# Patient Record
Sex: FEMALE | Race: WHITE | NOT HISPANIC OR LATINO | Employment: OTHER | URBAN - NONMETROPOLITAN AREA
[De-identification: names, ages, dates, MRNs, and addresses within clinical notes are randomized per-mention and may not be internally consistent; named-entity substitution may affect disease eponyms.]

---

## 2017-02-17 ENCOUNTER — APPOINTMENT (OUTPATIENT)
Dept: GENERAL RADIOLOGY | Facility: HOSPITAL | Age: 57
End: 2017-02-17

## 2017-02-17 ENCOUNTER — HOSPITAL ENCOUNTER (INPATIENT)
Facility: HOSPITAL | Age: 57
LOS: 1 days | Discharge: LEFT BEFORE DISCHARGE | End: 2017-02-17
Attending: FAMILY MEDICINE | Admitting: FAMILY MEDICINE

## 2017-02-17 ENCOUNTER — APPOINTMENT (OUTPATIENT)
Dept: ULTRASOUND IMAGING | Facility: HOSPITAL | Age: 57
End: 2017-02-17

## 2017-02-17 ENCOUNTER — APPOINTMENT (OUTPATIENT)
Dept: CT IMAGING | Facility: HOSPITAL | Age: 57
End: 2017-02-17

## 2017-02-17 VITALS
RESPIRATION RATE: 16 BRPM | BODY MASS INDEX: 30.2 KG/M2 | HEIGHT: 66 IN | DIASTOLIC BLOOD PRESSURE: 60 MMHG | WEIGHT: 187.9 LBS | OXYGEN SATURATION: 100 % | HEART RATE: 80 BPM | SYSTOLIC BLOOD PRESSURE: 91 MMHG | TEMPERATURE: 97.5 F

## 2017-02-17 DIAGNOSIS — E88.09 PROTEINS SERUM PLASMA LOW: ICD-10-CM

## 2017-02-17 DIAGNOSIS — R09.02 HYPOXEMIA: ICD-10-CM

## 2017-02-17 DIAGNOSIS — R07.9 CHEST PAIN, UNSPECIFIED TYPE: ICD-10-CM

## 2017-02-17 DIAGNOSIS — I95.9 HYPOTENSION, UNSPECIFIED HYPOTENSION TYPE: Primary | ICD-10-CM

## 2017-02-17 DIAGNOSIS — I50.9 HEART FAILURE, UNSPECIFIED (HCC): ICD-10-CM

## 2017-02-17 LAB
ALBUMIN SERPL-MCNC: 2.7 G/DL (ref 3.5–5)
ALBUMIN SERPL-MCNC: 2.8 G/DL (ref 3.5–5)
ALBUMIN/GLOB SERPL: 1.2 G/DL (ref 1.1–2.5)
ALBUMIN/GLOB SERPL: 1.4 G/DL (ref 1.1–2.5)
ALP SERPL-CCNC: 208 U/L (ref 24–120)
ALP SERPL-CCNC: 214 U/L (ref 24–120)
ALT SERPL W P-5'-P-CCNC: 220 U/L (ref 0–54)
ALT SERPL W P-5'-P-CCNC: 394 U/L (ref 0–54)
AMMONIA BLD-SCNC: <9 UMOL/L (ref 9–33)
AMYLASE SERPL-CCNC: 35 U/L (ref 30–110)
ANION GAP SERPL CALCULATED.3IONS-SCNC: 7 MMOL/L (ref 4–13)
ANION GAP SERPL CALCULATED.3IONS-SCNC: 9 MMOL/L (ref 4–13)
APTT PPP: 26.4 SECONDS (ref 24.1–34.8)
ARTERIAL PATENCY WRIST A: ABNORMAL
AST SERPL-CCNC: 468 U/L (ref 7–45)
AST SERPL-CCNC: 535 U/L (ref 7–45)
ATMOSPHERIC PRESS: ABNORMAL MMHG
BACTERIA BLD CULT: ABNORMAL
BACTERIA UR QL AUTO: ABNORMAL /HPF
BASE EXCESS BLDA CALC-SCNC: -3.5 MMOL/L (ref -2–2)
BASOPHILS # BLD AUTO: 0.01 10*3/MM3 (ref 0–0.2)
BASOPHILS NFR BLD AUTO: 0.1 % (ref 0–2)
BDY SITE: ABNORMAL
BILIRUB SERPL-MCNC: 0.3 MG/DL (ref 0.1–1)
BILIRUB SERPL-MCNC: 0.4 MG/DL (ref 0.1–1)
BILIRUB UR QL STRIP: NEGATIVE
BUN BLD-MCNC: 23 MG/DL (ref 5–21)
BUN BLD-MCNC: 24 MG/DL (ref 5–21)
BUN/CREAT SERPL: 22.4 (ref 7–25)
BUN/CREAT SERPL: 22.5 (ref 7–25)
CALCIUM SPEC-SCNC: 8.1 MG/DL (ref 8.4–10.4)
CALCIUM SPEC-SCNC: 8.7 MG/DL (ref 8.4–10.4)
CHLORIDE SERPL-SCNC: 104 MMOL/L (ref 98–110)
CHLORIDE SERPL-SCNC: 105 MMOL/L (ref 98–110)
CK MB SERPL-CCNC: 1.75 NG/ML (ref 0–5)
CLARITY UR: ABNORMAL
CO2 SERPL-SCNC: 23 MMOL/L (ref 24–31)
CO2 SERPL-SCNC: 26 MMOL/L (ref 24–31)
COHGB MFR BLD: 4 % (ref 0–5.1)
COLOR UR: YELLOW
CREAT BLD-MCNC: 1.02 MG/DL (ref 0.5–1.4)
CREAT BLD-MCNC: 1.07 MG/DL (ref 0.5–1.4)
CRP SERPL-MCNC: <0.5 MG/DL (ref 0–0.99)
D DIMER PPP FEU-MCNC: 3.66 MG/L (FEU) (ref 0–0.5)
DEPRECATED RDW RBC AUTO: 50.8 FL (ref 40–54)
DEPRECATED RDW RBC AUTO: 51.1 FL (ref 40–54)
EOSINOPHIL # BLD AUTO: 0.03 10*3/MM3 (ref 0–0.7)
EOSINOPHIL NFR BLD AUTO: 0.2 % (ref 0–4)
ERYTHROCYTE [DISTWIDTH] IN BLOOD BY AUTOMATED COUNT: 18.2 % (ref 12–15)
ERYTHROCYTE [DISTWIDTH] IN BLOOD BY AUTOMATED COUNT: 18.3 % (ref 12–15)
ERYTHROCYTE [SEDIMENTATION RATE] IN BLOOD: 1 MM/HR (ref 0–20)
GFR SERPL CREATININE-BSD FRML MDRD: 53 ML/MIN/1.73
GFR SERPL CREATININE-BSD FRML MDRD: 56 ML/MIN/1.73
GLOBULIN UR ELPH-MCNC: 2 GM/DL
GLOBULIN UR ELPH-MCNC: 2.2 GM/DL
GLUCOSE BLD-MCNC: 86 MG/DL (ref 70–100)
GLUCOSE BLD-MCNC: 89 MG/DL (ref 70–100)
GLUCOSE UR STRIP-MCNC: NEGATIVE MG/DL
HAV IGM SERPL QL IA: NEGATIVE
HBV CORE IGM SERPL QL IA: NEGATIVE
HBV SURFACE AG SERPL QL IA: NEGATIVE
HCO3 BLDA-SCNC: 20.7 MMOL/L (ref 22–26)
HCT VFR BLD AUTO: 34.3 % (ref 37–47)
HCT VFR BLD AUTO: 34.8 % (ref 37–47)
HCV AB SER DONR QL: NEGATIVE
HCV S/C RATIO: 0.01 (ref 0–0.99)
HGB BLD-MCNC: 10 G/DL (ref 12–16)
HGB BLD-MCNC: 9.7 G/DL (ref 12–16)
HGB BLDA-MCNC: 9.9 G/DL (ref 12–16)
HGB UR QL STRIP.AUTO: NEGATIVE
HOLD SPECIMEN: NORMAL
HOLD SPECIMEN: NORMAL
HYALINE CASTS UR QL AUTO: ABNORMAL /LPF
HYPOCHROMIA BLD QL: ABNORMAL
IMM GRANULOCYTES # BLD: 0.34 10*3/MM3 (ref 0–0.03)
IMM GRANULOCYTES NFR BLD: 1.9 % (ref 0–5)
INR PPP: 1.22 (ref 0.91–1.09)
KETONES UR QL STRIP: NEGATIVE
LEUKOCYTE ESTERASE UR QL STRIP.AUTO: ABNORMAL
LIPASE SERPL-CCNC: 85 U/L (ref 23–203)
LYMPHOCYTES # BLD AUTO: 0.18 10*3/MM3 (ref 0.72–4.86)
LYMPHOCYTES # BLD MANUAL: 0.48 10*3/MM3 (ref 0.72–4.86)
LYMPHOCYTES NFR BLD AUTO: 1 % (ref 15–45)
LYMPHOCYTES NFR BLD MANUAL: 1 % (ref 15–45)
LYMPHOCYTES NFR BLD MANUAL: 1 % (ref 4–12)
Lab: ABNORMAL
MCH RBC QN AUTO: 21.7 PG (ref 28–32)
MCH RBC QN AUTO: 21.9 PG (ref 28–32)
MCHC RBC AUTO-ENTMCNC: 28.3 G/DL (ref 33–36)
MCHC RBC AUTO-ENTMCNC: 28.7 G/DL (ref 33–36)
MCV RBC AUTO: 76.3 FL (ref 82–98)
MCV RBC AUTO: 76.9 FL (ref 82–98)
METHGB BLD QL: 0 % (ref 0.4–1.5)
MODALITY: ABNORMAL
MONOCYTES # BLD AUTO: 0.04 10*3/MM3 (ref 0.19–1.3)
MONOCYTES # BLD AUTO: 0.48 10*3/MM3 (ref 0.19–1.3)
MONOCYTES NFR BLD AUTO: 0.2 % (ref 4–12)
MYOGLOBIN SERPL-MCNC: 86.6 NG/ML (ref 0–110)
NEUTROPHILS # BLD AUTO: 17.41 10*3/MM3 (ref 1.87–8.4)
NEUTROPHILS # BLD AUTO: 46.93 10*3/MM3 (ref 1.87–8.4)
NEUTROPHILS NFR BLD AUTO: 96.6 % (ref 39–78)
NEUTROPHILS NFR BLD MANUAL: 80 % (ref 39–78)
NEUTS BAND NFR BLD MANUAL: 18 % (ref 0–10)
NITRITE UR QL STRIP: NEGATIVE
NOTIFIED BY: ABNORMAL
NOTIFIED WHO: ABNORMAL
NRBC BLD MANUAL-RTO: 0.5 /100 WBC (ref 0–0)
NT-PROBNP SERPL-MCNC: ABNORMAL PG/ML (ref 0–900)
OXYHGB MFR BLDV: 83.8 % (ref 94–97)
PCO2 BLDA: 33.8 MM HG (ref 35–45)
PH BLDA: 7.4 PH UNITS (ref 7.35–7.45)
PH UR STRIP.AUTO: 6 [PH] (ref 5–8)
PLAT MORPH BLD: NORMAL
PLATELET # BLD AUTO: 262 10*3/MM3 (ref 130–400)
PLATELET # BLD AUTO: 274 10*3/MM3 (ref 130–400)
PMV BLD AUTO: 9.2 FL (ref 6–12)
PMV BLD AUTO: 9.5 FL (ref 6–12)
PO2 BLDA: 62.5 MM HG (ref 80–100)
POIKILOCYTOSIS BLD QL SMEAR: ABNORMAL
POTASSIUM BLD-SCNC: 3.6 MMOL/L (ref 3.5–5.3)
POTASSIUM BLD-SCNC: 4.2 MMOL/L (ref 3.5–5.3)
POTASSIUM BLDA-SCNC: 3.49 MMOL/L (ref 3.5–5)
PROT SERPL-MCNC: 4.8 G/DL (ref 6.3–8.7)
PROT SERPL-MCNC: 4.9 G/DL (ref 6.3–8.7)
PROT UR QL STRIP: ABNORMAL
PROTHROMBIN TIME: 15.8 SECONDS (ref 11.9–14.6)
RBC # BLD AUTO: 4.46 10*6/MM3 (ref 4.2–5.4)
RBC # BLD AUTO: 4.56 10*6/MM3 (ref 4.2–5.4)
RBC # UR: ABNORMAL /HPF
REF LAB TEST METHOD: ABNORMAL
SCAN SLIDE: NORMAL
SODIUM BLD-SCNC: 137 MMOL/L (ref 135–145)
SODIUM BLD-SCNC: 137 MMOL/L (ref 135–145)
SODIUM BLDA-SCNC: 133.8 MMOL/L (ref 135–145)
SP GR UR STRIP: 1.01 (ref 1–1.03)
SQUAMOUS #/AREA URNS HPF: ABNORMAL /HPF
TROPONIN I SERPL-MCNC: 0.07 NG/ML (ref 0–0.03)
TROPONIN I SERPL-MCNC: 0.07 NG/ML (ref 0–0.03)
TROPONIN I SERPL-MCNC: 0.07 NG/ML (ref 0–0.07)
TROPONIN I SERPL-MCNC: 0.09 NG/ML (ref 0–0.03)
TROPONIN I SERPL-MCNC: 0.1 NG/ML (ref 0–0.03)
UROBILINOGEN UR QL STRIP: ABNORMAL
WBC MORPH BLD: NORMAL
WBC NRBC COR # BLD: 18.01 10*3/MM3 (ref 4.8–10.8)
WBC NRBC COR # BLD: 47.89 10*3/MM3 (ref 4.8–10.8)
WBC UR QL AUTO: ABNORMAL /HPF
WHOLE BLOOD HOLD SPECIMEN: NORMAL
WHOLE BLOOD HOLD SPECIMEN: NORMAL

## 2017-02-17 PROCEDURE — 93005 ELECTROCARDIOGRAM TRACING: CPT

## 2017-02-17 PROCEDURE — 25010000002 ONDANSETRON PER 1 MG: Performed by: FAMILY MEDICINE

## 2017-02-17 PROCEDURE — 82805 BLOOD GASES W/O2 SATURATION: CPT

## 2017-02-17 PROCEDURE — 99285 EMERGENCY DEPT VISIT HI MDM: CPT

## 2017-02-17 PROCEDURE — 87040 BLOOD CULTURE FOR BACTERIA: CPT | Performed by: FAMILY MEDICINE

## 2017-02-17 PROCEDURE — 87186 SC STD MICRODIL/AGAR DIL: CPT | Performed by: INTERNAL MEDICINE

## 2017-02-17 PROCEDURE — 71275 CT ANGIOGRAPHY CHEST: CPT

## 2017-02-17 PROCEDURE — 84484 ASSAY OF TROPONIN QUANT: CPT | Performed by: FAMILY MEDICINE

## 2017-02-17 PROCEDURE — 83050 HGB METHEMOGLOBIN QUAN: CPT

## 2017-02-17 PROCEDURE — 83880 ASSAY OF NATRIURETIC PEPTIDE: CPT | Performed by: FAMILY MEDICINE

## 2017-02-17 PROCEDURE — 85025 COMPLETE CBC W/AUTO DIFF WBC: CPT | Performed by: FAMILY MEDICINE

## 2017-02-17 PROCEDURE — 85651 RBC SED RATE NONAUTOMATED: CPT | Performed by: FAMILY MEDICINE

## 2017-02-17 PROCEDURE — 93010 ELECTROCARDIOGRAM REPORT: CPT | Performed by: INTERNAL MEDICINE

## 2017-02-17 PROCEDURE — 87185 SC STD ENZYME DETCJ PER NZM: CPT | Performed by: FAMILY MEDICINE

## 2017-02-17 PROCEDURE — 0 IOPAMIDOL PER 1 ML: Performed by: FAMILY MEDICINE

## 2017-02-17 PROCEDURE — 82150 ASSAY OF AMYLASE: CPT | Performed by: INTERNAL MEDICINE

## 2017-02-17 PROCEDURE — 85610 PROTHROMBIN TIME: CPT | Performed by: INTERNAL MEDICINE

## 2017-02-17 PROCEDURE — 80074 ACUTE HEPATITIS PANEL: CPT | Performed by: INTERNAL MEDICINE

## 2017-02-17 PROCEDURE — 87088 URINE BACTERIA CULTURE: CPT | Performed by: INTERNAL MEDICINE

## 2017-02-17 PROCEDURE — 87040 BLOOD CULTURE FOR BACTERIA: CPT | Performed by: INTERNAL MEDICINE

## 2017-02-17 PROCEDURE — 87205 SMEAR GRAM STAIN: CPT | Performed by: FAMILY MEDICINE

## 2017-02-17 PROCEDURE — 25010000002 ALBUMIN HUMAN 25% PER 50 ML: Performed by: FAMILY MEDICINE

## 2017-02-17 PROCEDURE — 25010000002 DOPAMINE PER 40 MG: Performed by: FAMILY MEDICINE

## 2017-02-17 PROCEDURE — 87077 CULTURE AEROBIC IDENTIFY: CPT | Performed by: INTERNAL MEDICINE

## 2017-02-17 PROCEDURE — 87147 CULTURE TYPE IMMUNOLOGIC: CPT | Performed by: FAMILY MEDICINE

## 2017-02-17 PROCEDURE — 82140 ASSAY OF AMMONIA: CPT | Performed by: INTERNAL MEDICINE

## 2017-02-17 PROCEDURE — 85379 FIBRIN DEGRADATION QUANT: CPT | Performed by: FAMILY MEDICINE

## 2017-02-17 PROCEDURE — 94799 UNLISTED PULMONARY SVC/PX: CPT

## 2017-02-17 PROCEDURE — 87150 DNA/RNA AMPLIFIED PROBE: CPT | Performed by: FAMILY MEDICINE

## 2017-02-17 PROCEDURE — 71010 HC CHEST PA OR AP: CPT

## 2017-02-17 PROCEDURE — 87086 URINE CULTURE/COLONY COUNT: CPT | Performed by: INTERNAL MEDICINE

## 2017-02-17 PROCEDURE — 80053 COMPREHEN METABOLIC PANEL: CPT | Performed by: FAMILY MEDICINE

## 2017-02-17 PROCEDURE — 84484 ASSAY OF TROPONIN QUANT: CPT

## 2017-02-17 PROCEDURE — 82553 CREATINE MB FRACTION: CPT | Performed by: FAMILY MEDICINE

## 2017-02-17 PROCEDURE — 82375 ASSAY CARBOXYHB QUANT: CPT

## 2017-02-17 PROCEDURE — 84484 ASSAY OF TROPONIN QUANT: CPT | Performed by: INTERNAL MEDICINE

## 2017-02-17 PROCEDURE — 83690 ASSAY OF LIPASE: CPT | Performed by: INTERNAL MEDICINE

## 2017-02-17 PROCEDURE — 25010000002 LEVOFLOXACIN PER 250 MG: Performed by: INTERNAL MEDICINE

## 2017-02-17 PROCEDURE — 86140 C-REACTIVE PROTEIN: CPT | Performed by: FAMILY MEDICINE

## 2017-02-17 PROCEDURE — 85730 THROMBOPLASTIN TIME PARTIAL: CPT | Performed by: INTERNAL MEDICINE

## 2017-02-17 PROCEDURE — P9046 ALBUMIN (HUMAN), 25%, 20 ML: HCPCS | Performed by: FAMILY MEDICINE

## 2017-02-17 PROCEDURE — 76705 ECHO EXAM OF ABDOMEN: CPT

## 2017-02-17 PROCEDURE — 87186 SC STD MICRODIL/AGAR DIL: CPT | Performed by: FAMILY MEDICINE

## 2017-02-17 PROCEDURE — 25010000002 HYDROCORTISONE SODIUM SUCCINATE 100 MG RECONSTITUTED SOLUTION: Performed by: INTERNAL MEDICINE

## 2017-02-17 PROCEDURE — 83874 ASSAY OF MYOGLOBIN: CPT | Performed by: FAMILY MEDICINE

## 2017-02-17 PROCEDURE — 36600 WITHDRAWAL OF ARTERIAL BLOOD: CPT

## 2017-02-17 PROCEDURE — 81001 URINALYSIS AUTO W/SCOPE: CPT | Performed by: INTERNAL MEDICINE

## 2017-02-17 PROCEDURE — 85007 BL SMEAR W/DIFF WBC COUNT: CPT | Performed by: FAMILY MEDICINE

## 2017-02-17 RX ORDER — ONDANSETRON 2 MG/ML
4 INJECTION INTRAMUSCULAR; INTRAVENOUS EVERY 6 HOURS PRN
Status: DISCONTINUED | OUTPATIENT
Start: 2017-02-17 | End: 2017-02-17 | Stop reason: HOSPADM

## 2017-02-17 RX ORDER — SODIUM CHLORIDE 9 MG/ML
40 INJECTION, SOLUTION INTRAVENOUS CONTINUOUS
Status: DISCONTINUED | OUTPATIENT
Start: 2017-02-17 | End: 2017-02-17 | Stop reason: HOSPADM

## 2017-02-17 RX ORDER — ALBUMIN (HUMAN) 12.5 G/50ML
12.5 SOLUTION INTRAVENOUS ONCE
Status: COMPLETED | OUTPATIENT
Start: 2017-02-17 | End: 2017-02-17

## 2017-02-17 RX ORDER — ALBUMIN (HUMAN) 12.5 G/50ML
25 SOLUTION INTRAVENOUS ONCE
Status: DISCONTINUED | OUTPATIENT
Start: 2017-02-18 | End: 2017-02-17 | Stop reason: HOSPADM

## 2017-02-17 RX ORDER — LEVOTHYROXINE SODIUM 0.12 MG/1
125 TABLET ORAL DAILY
Status: DISCONTINUED | OUTPATIENT
Start: 2017-02-17 | End: 2017-02-17 | Stop reason: HOSPADM

## 2017-02-17 RX ORDER — CLOPIDOGREL BISULFATE 75 MG/1
75 TABLET ORAL DAILY
Status: DISCONTINUED | OUTPATIENT
Start: 2017-02-17 | End: 2017-02-17 | Stop reason: SDUPTHER

## 2017-02-17 RX ORDER — DOPAMINE HYDROCHLORIDE 160 MG/100ML
2-20 INJECTION, SOLUTION INTRAVENOUS
Status: DISCONTINUED | OUTPATIENT
Start: 2017-02-17 | End: 2017-02-17 | Stop reason: HOSPADM

## 2017-02-17 RX ORDER — ASPIRIN 81 MG/1
81 TABLET, CHEWABLE ORAL DAILY
Status: DISCONTINUED | OUTPATIENT
Start: 2017-02-17 | End: 2017-02-17 | Stop reason: HOSPADM

## 2017-02-17 RX ORDER — CLOPIDOGREL BISULFATE 75 MG/1
75 TABLET ORAL DAILY
Status: DISCONTINUED | OUTPATIENT
Start: 2017-02-17 | End: 2017-02-17 | Stop reason: HOSPADM

## 2017-02-17 RX ORDER — ONDANSETRON 2 MG/ML
4 INJECTION INTRAMUSCULAR; INTRAVENOUS ONCE
Status: COMPLETED | OUTPATIENT
Start: 2017-02-17 | End: 2017-02-17

## 2017-02-17 RX ORDER — PREDNISONE 10 MG/1
10 TABLET ORAL DAILY
Status: DISCONTINUED | OUTPATIENT
Start: 2017-02-17 | End: 2017-02-17

## 2017-02-17 RX ORDER — IPRATROPIUM BROMIDE AND ALBUTEROL SULFATE 2.5; .5 MG/3ML; MG/3ML
3 SOLUTION RESPIRATORY (INHALATION) EVERY 4 HOURS PRN
Status: DISCONTINUED | OUTPATIENT
Start: 2017-02-17 | End: 2017-02-17 | Stop reason: HOSPADM

## 2017-02-17 RX ORDER — PANTOPRAZOLE SODIUM 40 MG/1
40 TABLET, DELAYED RELEASE ORAL
Status: DISCONTINUED | OUTPATIENT
Start: 2017-02-17 | End: 2017-02-17 | Stop reason: HOSPADM

## 2017-02-17 RX ORDER — LEVOFLOXACIN 5 MG/ML
750 INJECTION, SOLUTION INTRAVENOUS EVERY 24 HOURS
Status: DISCONTINUED | OUTPATIENT
Start: 2017-02-17 | End: 2017-02-17 | Stop reason: HOSPADM

## 2017-02-17 RX ORDER — ACETAMINOPHEN 325 MG/1
650 TABLET ORAL EVERY 6 HOURS PRN
Status: DISCONTINUED | OUTPATIENT
Start: 2017-02-17 | End: 2017-02-17 | Stop reason: HOSPADM

## 2017-02-17 RX ORDER — MORPHINE SULFATE 2 MG/ML
2 INJECTION, SOLUTION INTRAMUSCULAR; INTRAVENOUS ONCE
Status: DISCONTINUED | OUTPATIENT
Start: 2017-02-17 | End: 2017-02-17

## 2017-02-17 RX ORDER — MIDODRINE HYDROCHLORIDE 10 MG/1
10 TABLET ORAL 3 TIMES DAILY
Status: DISCONTINUED | OUTPATIENT
Start: 2017-02-17 | End: 2017-02-17

## 2017-02-17 RX ADMIN — ALBUMIN HUMAN 12.5 G: 0.25 SOLUTION INTRAVENOUS at 12:30

## 2017-02-17 RX ADMIN — METRONIDAZOLE 500 MG: 500 INJECTION, SOLUTION INTRAVENOUS at 15:04

## 2017-02-17 RX ADMIN — SODIUM CHLORIDE 500 ML: 0.9 INJECTION, SOLUTION INTRAVENOUS at 02:12

## 2017-02-17 RX ADMIN — PANTOPRAZOLE SODIUM 40 MG: 40 TABLET, DELAYED RELEASE ORAL at 06:48

## 2017-02-17 RX ADMIN — HYDROCORTISONE SODIUM SUCCINATE 100 MG: 100 INJECTION, POWDER, FOR SOLUTION INTRAMUSCULAR; INTRAVENOUS at 06:48

## 2017-02-17 RX ADMIN — LEVOTHYROXINE SODIUM 125 MCG: 0.12 TABLET ORAL at 12:30

## 2017-02-17 RX ADMIN — HYDROCORTISONE SODIUM SUCCINATE 100 MG: 100 INJECTION, POWDER, FOR SOLUTION INTRAMUSCULAR; INTRAVENOUS at 15:04

## 2017-02-17 RX ADMIN — DOPAMINE HYDROCHLORIDE 7 MCG/KG/MIN: 160 INJECTION, SOLUTION INTRAVENOUS at 12:30

## 2017-02-17 RX ADMIN — ACETAMINOPHEN 650 MG: 325 TABLET, FILM COATED ORAL at 15:43

## 2017-02-17 RX ADMIN — SODIUM CHLORIDE 125 ML/HR: 9 INJECTION, SOLUTION INTRAVENOUS at 15:04

## 2017-02-17 RX ADMIN — ONDANSETRON HYDROCHLORIDE 4 MG: 2 SOLUTION INTRAMUSCULAR; INTRAVENOUS at 02:44

## 2017-02-17 RX ADMIN — IOPAMIDOL 150 ML: 755 INJECTION, SOLUTION INTRAVENOUS at 11:15

## 2017-02-17 RX ADMIN — CLOPIDOGREL BISULFATE 75 MG: 75 TABLET, FILM COATED ORAL at 12:30

## 2017-02-17 RX ADMIN — METRONIDAZOLE 500 MG: 500 INJECTION, SOLUTION INTRAVENOUS at 08:38

## 2017-02-17 RX ADMIN — LEVOFLOXACIN 750 MG: 5 INJECTION, SOLUTION INTRAVENOUS at 06:36

## 2017-02-17 RX ADMIN — SODIUM CHLORIDE 125 ML/HR: 9 INJECTION, SOLUTION INTRAVENOUS at 05:21

## 2017-02-17 RX ADMIN — Medication 81 MG: at 12:29

## 2017-02-17 RX ADMIN — DOPAMINE HYDROCHLORIDE 5 MCG/KG/MIN: 160 INJECTION, SOLUTION INTRAVENOUS at 03:49

## 2017-02-17 NOTE — PROGRESS NOTES
[facility]  INPATIENT PROGRESS NOTE    Length of Stay: 0  Date of Admission: 2/17/2017  Primary Care Physician: Pramod Alberts DO    Subjective   Chief Complaint: Pain between shoulder blades.     HPI   Still with some nausea.   Still having pain between shoulder blades.    Chest discomfort.  Wants a otero.  No reported diarrhea this AM    Dr Sina Alberts called and history reviewed with him.      Review of Systems   Constitutional: Positive for fatigue.   HENT: Negative.    Eyes: Negative.    Respiratory: Positive for shortness of breath.    Cardiovascular: Positive for chest pain and leg swelling.   Gastrointestinal: Positive for diarrhea, nausea and vomiting.   Endocrine: Negative.    Genitourinary: Negative.    Musculoskeletal: Positive for arthralgias, back pain (sharp between shoulder blades.) and myalgias.   Skin: Positive for wound (bilateral knees with abraisions.).   Allergic/Immunologic: Negative.    Neurological: Positive for weakness.   Hematological: Bruises/bleeds easily.   Psychiatric/Behavioral: Negative.           Objective    Temp:  [96.7 °F (35.9 °C)-97.5 °F (36.4 °C)] 96.7 °F (35.9 °C)  Heart Rate:  [] 95  Resp:  [12-19] 13  BP: ()/(36-65) 112/65  Physical Exam   Constitutional: She is oriented to person, place, and time. She appears well-developed and well-nourished.   HENT:   Head: Normocephalic and atraumatic.   Mouth/Throat: Oropharynx is clear and moist.   Eyes: Conjunctivae and EOM are normal. Pupils are equal, round, and reactive to light.   Neck: Neck supple. No JVD present. No tracheal deviation present.   Cardiovascular: Normal rate, regular rhythm, normal heart sounds and intact distal pulses.  Exam reveals no gallop and no friction rub.    No murmur heard.  Pulmonary/Chest: Effort normal and breath sounds normal. No respiratory distress.   Abdominal: Soft. Bowel sounds are normal. There is no tenderness.    Musculoskeletal: She exhibits edema (bilateral lower extremity edema ongoing problem).   Neurological: She is alert and oriented to person, place, and time. No cranial nerve deficit.   Skin: Skin is warm and dry. She is not diaphoretic.   Bruising.   Psychiatric:   Affect flat.           Results Review:  I have reviewed the labs, radiology results, and diagnostic studies.    Laboratory Data:     Results from last 7 days  Lab Units 02/17/17  0119   WBC 10*3/mm3 18.01*   HEMOGLOBIN g/dL 10.0*   HEMATOCRIT % 34.8*   PLATELETS 10*3/mm3 262          Results from last 7 days  Lab Units 02/17/17  0208 02/17/17  0119   SODIUM mmol/L  --  137   SODIUM, ARTERIAL mmol/L 133.8*  --    POTASSIUM mmol/L  --  3.6   CHLORIDE mmol/L  --  105   TOTAL CO2 mmol/L  --  23.0*   BUN mg/dL  --  23*   CREATININE mg/dL  --  1.02   CALCIUM mg/dL  --  8.7   BILIRUBIN mg/dL  --  0.4   ALK PHOS U/L  --  214*   ALT (SGPT) U/L  --  220*   AST (SGOT) U/L  --  468*   GLUCOSE mg/dL  --  89       Culture Data:        Radiology Data:   Imaging Results (last 24 hours)     Procedure Component Value Units Date/Time    XR Chest 1 View [[id number]] Collected:  02/17/17 0725     Updated:  02/17/17 0728    Narrative:       EXAMINATION:  XR CHEST 1 VW-  2/17/2017 2:00 AM CST     HISTORY: Chest pain.     COMPARISON: 08/01/2012.     FINDINGS:  There is no dense infiltrate or effusion. Heart size is upper  limits of normal. No CHF. There is elevation or eventration of the right  hemidiaphragm. There is a new port catheter on the left with the  catheter tip extending towards the brachiocephalic vein. It does not  extend into the superior vena cava. There is a PICC line catheter on the  right in good position.       Impression:       Portable chest x-ray demonstrates no active disease.        This report was finalized on [id number] by Dr. Haroon Trammell MD.          I have reviewed the patient current medications.     Assessment/Plan     Hospital Problem List      Hypotension          Assessment    Hypotension on vasopressor (dopamine)  Pain between shoulder blades.  Nausea  Vomiting  Diarrhea at home.  CAD/MI  Hypoalbuminemia/transamintis chronic, sees liver specialist in [facility]  Adrenal insufficiency  On chronic steroid therapy  GERD  Hypoxemia  Hx of DVT/PE    Plan    CTA chest  IVF  Continue vasopressor support  Repeat lab work stat, troponin, cbc, cmp  IV steroids.  Lab in AM  CMP, CBC, Chest xray        Discharge Planning: I expect patient to be discharged to home in 4-5 days.    Lorna James DO   02/17/17   8:15 AM

## 2017-02-17 NOTE — ED PROVIDER NOTES
Subjective   Patient is a 56 y.o. female presenting with nausea.   Nausea   The primary symptoms include fatigue, nausea and vomiting. The illness began today. The onset was gradual.   The illness is also significant for chills and back pain.       Review of Systems   Constitutional: Positive for chills and fatigue.   Respiratory: Positive for chest tightness and shortness of breath.    Cardiovascular: Positive for chest pain.   Gastrointestinal: Positive for nausea and vomiting.   Musculoskeletal: Positive for back pain.       Past Medical History   Diagnosis Date   • Abnormal albumin    • Clotting disorder    • Coronary artery disease    • Disease of thyroid gland    • Hypertension    • MI (myocardial infarction)        No Known Allergies    Past Surgical History   Procedure Laterality Date   • Cardiac catheterization N/A 12/23/2016     Procedure: Left Heart Cath;  Surgeon: Alexx Richardson MD;  Location: [facility] CATH INVASIVE LOCATION;  Service:        History reviewed. No pertinent family history.    Social History     Social History   • Marital status: Single     Spouse name: N/A   • Number of children: N/A   • Years of education: N/A     Social History Main Topics   • Smoking status: Current Every Day Smoker     Packs/day: 1.00     Years: 15.00     Start date: 12/22/2016   • Smokeless tobacco: Never Used   • Alcohol use No   • Drug use: No   • Sexual activity: Defer     Other Topics Concern   • None     Social History Narrative   • None           Objective   Physical Exam   Constitutional: She is oriented to person, place, and time. She appears well-developed and well-nourished.   HENT:   Head: Normocephalic.   Nose: Nose normal.   Mouth/Throat: Oropharynx is clear and moist.   Eyes: Conjunctivae and EOM are normal. Pupils are equal, round, and reactive to light.   Neck: Normal range of motion. Neck supple. No JVD present. No thyromegaly present.   Cardiovascular: Normal rate, regular rhythm, normal heart sounds  and intact distal pulses.    Pulmonary/Chest: Effort normal and breath sounds normal.   Abdominal: Soft. Bowel sounds are normal. She exhibits no distension and no mass. There is no tenderness. There is no rebound and no guarding.   Musculoskeletal: Normal range of motion.   Lymphadenopathy:     She has no cervical adenopathy.   Neurological: She is alert and oriented to person, place, and time.   Skin: Skin is warm and dry. No rash noted. No erythema. There is pallor.   Psychiatric: She has a normal mood and affect. Her behavior is normal. Judgment and thought content normal.   Nursing note and vitals reviewed.      Procedures         ED Course  ED Course        Labs Reviewed   COMPREHENSIVE METABOLIC PANEL - Abnormal; Notable for the following:        Result Value    BUN 23 (*)     CO2 23.0 (*)     Total Protein 4.8 (*)     Albumin 2.80 (*)     ALT (SGPT) 220 (*)     AST (SGOT) 468 (*)     Alkaline Phosphatase 214 (*)     eGFR Non  Amer 56 (*)     All other components within normal limits   BNP (IN-HOUSE) - Abnormal; Notable for the following:     proBNP 89023.0 (*)     All other components within normal limits   D-DIMER, QUANTITATIVE - Abnormal; Notable for the following:     D-Dimer, Quantitative 3.66 (*)     All other components within normal limits    Narrative:     Reference Range is 0-0.50 mg/L FEU. However, results <0.50 mg/L FEU tends to rule out DVT or PE. Results >0.50 mg/L FEU are not useful in predicting absence or presence of DVT or PE.   CBC WITH AUTO DIFFERENTIAL - Abnormal; Notable for the following:     WBC 18.01 (*)     Hemoglobin 10.0 (*)     Hematocrit 34.8 (*)     MCV 76.3 (*)     MCH 21.9 (*)     MCHC 28.7 (*)     RDW 18.3 (*)     Neutrophil % 96.6 (*)     Lymphocyte % 1.0 (*)     Monocyte % 0.2 (*)     Neutrophils, Absolute 17.41 (*)     Lymphocytes, Absolute 0.18 (*)     Monocytes, Absolute 0.04 (*)     Immature Grans, Absolute 0.34 (*)     nRBC 0.5 (*)     All other components  within normal limits   BLOOD GAS, ARTERIAL W/CO-OXIMETRY - Abnormal; Notable for the following:     pCO2, Arterial 33.8 (*)     pO2, Arterial 62.5 (*)     HCO3, Arterial 20.7 (*)     Base Excess, Arterial -3.5 (*)     Hemoglobin, Blood Gas 9.9 (*)     Oxyhemoglobin 83.8 (*)     Methemoglobin 0.0 (*)     Sodium, Arterial 133.8 (*)     Potassium, Arterial 3.49 (*)     All other components within normal limits    Narrative:     Serial Number: [id number]    : [id number]   MYOGLOBIN, SERUM - Normal   CK MB - Normal    Narrative:     CKMB Index not indicated   C-REACTIVE PROTEIN - Normal   SEDIMENTATION RATE - Normal   POCT TROPONIN I, RAPID - Normal   BLOOD CULTURE WITH JONATHON   BLOOD CULTURE   BLOOD CULTURE   OVA AND PARASITE EXAMINATION   CLOSTRIDIUM DIFFICILE TOXIN, PCR   FECAL LEUKOCYTES   GASTROINTESTINAL PANEL, PCR   BLOOD GAS, ARTERIAL W/CO-OXIMETRY   RAINBOW DRAW    Narrative:     The following orders were created for panel order [place] Draw.  Procedure                               Abnormality         Status                     ---------                               -----------         ------                     Light Blue Top[[id number]]                                    Final result               Green Top (Gel)[[id number]]                                   Final result               Lavender Top[[id number]]                                      Final result               Red Top[[id number]]                                           Final result               Green Top (No Gel)[[id number]]                                                             Please view results for these tests on the individual orders.   URINALYSIS W/ CULTURE IF INDICATED   HEPATITIS PANEL, ACUTE   TROPONIN (IN-HOUSE)   TROPONIN (IN-HOUSE)   URINALYSIS W/ CULTURE IF INDICATED   OCCULT BLOOD X 1, STOOL   AMMONIA   PROTIME-INR   APTT   AMYLASE   LIPASE   POCT TROPONIN I, RAPID   LIGHT BLUE TOP   GREEN TOP   LAVENDER TOP   RED TOP   CBC AND  DIFFERENTIAL    Narrative:     The following orders were created for panel order CBC & Differential.  Procedure                               Abnormality         Status                     ---------                               -----------         ------                     CBC Auto Differential[[id number]]         Abnormal            Final result                 Please view results for these tests on the individual orders.   GREEN TOP NO GEL               MDM  Number of Diagnoses or Management Options  Chest pain, unspecified type: new and requires workup  Heart failure, unspecified: new and requires workup  Hypotension, unspecified hypotension type: established and worsening  Hypoxemia: new and requires workup  Proteins serum plasma low: established and worsening     Amount and/or Complexity of Data Reviewed  Clinical lab tests: ordered and reviewed  Tests in the radiology section of CPT®: ordered and reviewed  Decide to obtain previous medical records or to obtain history from someone other than the patient: yes  Obtain history from someone other than the patient: yes  Review and summarize past medical records: yes  Discuss the patient with other providers: yes  Independent visualization of images, tracings, or specimens: yes    Risk of Complications, Morbidity, and/or Mortality  Presenting problems: high  Diagnostic procedures: high  Management options: high    Patient Progress  Patient progress: stable      Final diagnoses:   Hypotension, unspecified hypotension type   Heart failure, unspecified   Hypoxemia   Proteins serum plasma low   Chest pain, unspecified type            Ishaan Carlson MD  02/17/17 0631

## 2017-02-17 NOTE — H&P
"   TIME: 5:16 a.m.     ADMITTING PHYSICIAN:  Jag Carvajal MD for the Hospitalist Service.     PRIMARY CARE PROVIDER: Pramod Alberts DO    HISTORY:  Ms. Jameson is a 56-year-old  female who presents to [facility] due to a multiplicity of complaints including nausea, vomiting, diarrhea, diaphoresis, and headache. In addition, she also relates pain \"between the shoulder blades\", which she describes as quite severe. Her symptoms awoke her from sleep at approximately 11:00 p.m. last evening. Ms. Jameson receives albumin infusion due to hypoalbuminemia of uncertain etiology. She relates that she frequently has adverse type reactions such as nausea, vomiting and diarrhea. However, she has not informed her primary care physician, Dr. Alberts of these adverse events.     Also notes that Ms. Jameson, also relates having chronic leukocytosis. This came up for discussion while reviewing her laboratory studies. I reviewed her previous hospitalization and at that time her white blood cell count was within normal limits.     Ms. Jameson also informs me that she is on chronic prednisone therapy for adrenal insufficiency.     Presently, she is resting comfortably and is in no distress. At presentation, she was found to be hypotensive and tachycardic and for those reasons she was admitted to the intensive care.     REVIEW OF SYSTEMS: Otherwise unremarkable from a cardiovascular, pulmonary, gastrointestinal, genitourinary, neurologic, psychiatric, metabolic and constitutional standpoint except as noted. She relates fatigue and weakness. She has had no definite fevers, chills, or sweats. Her appetite has been good and her weight is stable. She has had no chest pain, chest palpitations, or shortness of breath. She relates chronic lower extremity swelling. She has had no orthopnea, cough, wheezing, or hemoptysis. She has had no abdominal pain, but relates nausea, vomiting and diarrhea. She has had " no constipation. She has had no dysphagia or odynophagia. She has had no hematemesis, hematochezia, or melena. She has had no flank pain, pelvic pain, hematuria, or dysuria. She has had no skin rashes, arthralgias or myalgias. She relates headaches. She has had no confusion, memory deficits or loss of consciousness. She has had no changes in her vision or hearing. She has had no acute motor or sensory deficits. She has had no gait abnormalities.     PAST MEDICAL HISTORY:  1.  Coronary artery disease.   2.  Myocardial infarction.   3.  Pneumonia.   4.  C. difficile colitis.   5.  Sepsis.   6.  Adrenal insufficiency per patient's report.   7.  Chronic prednisone use.   8.  Hypothyroidism.  9.  Chronic lower extremity edema.   10.  Hypoalbuminemia.   11.  Obesity.   12.  Gastroesophageal reflux disease.   13.  H. pylori positive with ongoing treatment with Augmentin.   14.  Restless leg syndrome.  15.  Hepatic cirrhosis?    PAST SURGICAL HISTORY:  1.  Status post cholecystectomy.   2.  Status post hysterectomy.   3.  Status post exploratory laparotomy for uncertain reasons.   4.  Status post ventral hernia repair.   5.  Status post cardiac stent deployment.     ALLERGIES: No known drug allergies.     HOME MEDICATIONS:  1.  Aspirin 81 mg p.o. daily.   2.  Lipitor 40 mg p.o. daily.   3.  Plavix 75 mg p.o. daily.   4.  Neurontin 300 mg p.o. t.i.d.   5.  Synthroid 0.125 mg p.o. daily.   6.  Zaroxolyn 5 mg p.o. daily.   7.  Midodrine 5 mg p.o. t.i.d.   8.  Prilosec 20 mg p.o. daily.   9.  Potassium chloride 20 mEq p.o. b.i.d.?   10.  Prednisone 10 mg p.o. daily.   11.  Seroquel 100 mg p.o. at bedtime.   12.  Requip 0.5 mg p.o. at bedtime.      SOCIAL HISTORY: Significant for being a resident of [place], [place]. She is . She has a son and daughter in good health. She relates being disabled. She has a high school education, as well as 2 years of college study. She smokes a pack of cigarettes per day. She has no  history of alcohol or illicit drug use. She attends [employer]. She has no recent history of travel outside this region.     She designates her sister, Sarina Flores, to serve as a surrogate for healthcare matters should such become necessary.     CODE STATUS: She is a FULL CODE.     FAMILY HISTORY: Significant for having 2 sisters in good health. Her mother is  due to heart attack. Her father is alive and is in apparent good health.     PHYSICAL EXAMINATION:  VITAL SIGNS: Temperature is 96.7, pulse 106, respirations are 15 and unlabored, blood pressure 91/53 and O2 saturation is 95% with supplemental oxygen, weight 187 pounds.     GENERAL: This is a 56-year-old  female appearing older than her documented age. She is resting comfortably in bed. She is in no apparent distress. She is interactive and cooperative. She proves to be a poor historian. She has a cushingoid appearance.     HEAD AND NECK EXAM: Essentially unremarkable except as noted. I see no signs of acute trauma. Eyes, nose, and throat are grossly unremarkable. Sclerae are clear. There is no discharge from the nostrils. Mucous membranes are moist.     NECK: Supple. She has no cervical or clavicular adenopathy. She has no definite carotid bruits. There are no masses of the head or neck. Neck veins do not appear pathologically distended.     CARDIAC EXAM: Reveals S1 and S2 with a regular rhythm. She has no definite murmurs, rubs, or gallops. Her precordium is hyperdynamic.     LUNGS EXAM: Reveals bilateral breath sounds are clear to auscultation throughout. She has no rales, wheezes, or rhonchi.     ABDOMEN EXAM: Reveals bowel sounds to be present. Her abdomen is nontender, nondistended, soft, and obese.     EXTREMITIES EXAM: No lower extremity calf tenderness or erythema. She has 1+ bilateral lower extremity edema.     NEUROLOGIC EXAM: Reveals the patient to be awake and alert. She seems oriented to person, place, time and  situation. Cranial nerves 2-12 appear grossly intact. She exhibits no definite focal, motor or sensory deficits. She seems able to move all extremities without difficulty and at will. Note that the patient appears mildly sedated.     PSYCHIATRIC EXAM: Deferred due to the patient's sedation.     DIAGNOSTIC DATA: Sodium 137, potassium 3.6, chloride 105, CO2 of 23, BUN 23, creatinine 1.02, glucose 89, total calcium 8.7.     Liver function testing demonstrates total protein 4.8, albumin 2.8, ALT is 220, AST is 468.     CBC demonstrates a white blood cell count of 18.0, hemoglobin 10.0, hematocrit 34.8 and platelet count of 262,000.     ProBNP is 10,300.     D-dimer is 3.66.     Arterial blood gas demonstrates a pH of 7.404, pCO2 of 33.8, pO2 of 62.5, O2 saturation 83.8%.     Results of chest x-ray are pending at this time.     EKG demonstrates sinus rhythm of 98 beats per minute.     IMPRESSION:   1.  Hypotension and leukocytosis of questionable etiology.   2.  Sepsis?   3.  Coronary artery disease with recent history of myocardial infarction.   4.  Adrenal insufficiency.   5.  Hypothyroidism.   6.  Anemia.   7.  Abnormal liver function tests.    8.  Recent diagnosis of H. pylori.   9.  Cirrhosis?    PLAN: At this time Ms. Jameson will be admitted to [facility] for further evaluation and treatment. Her admitting diagnoses are as noted. Her condition at this time is guarded. She will be housed in the CCU.     I have asked the nursing staff to obtain vital signs per protocol. She will be confined to bedrest. As noted, she has no known drug allergies. I have asked the nursing staff to monitor input and output. Daily weights will be obtained. She will be held n.p.o. at this point pending improvement in her status. IV fluids will consist of normal saline at 75 mL/h.  Oxygen will be used as needed to maintain her O2 saturation greater than 92%. She is a FULL CODE. Fall precautions are to be instituted.      INITIAL ADMITTING MEDICATIONS:  1.  Aspirin 81 mg p.o. daily.   2.  Plavix 75 mg p.o. daily.   3.  Solu-Cortef 100 mg IV q.8 h.   4.  Levaquin 750 mg IV daily.   5.  Synthroid 0.125 mg p.o. daily.   6.  Flagyl 500 mg IV q.8 h.   7.  Protonix 40 mg p.o. daily.   8.  Tylenol 650 mg p.o. q.6 h. p.r.n. for fever and/or discomfort.   9.  DuoNeb 1 unit q.4 h. p.r.n. for shortness of breath.   10.  Zofran 4 mg IV q.6 h. p.r.n. for nausea and vomiting.     I will obtain an ultrasound study of the liver.     I will obtain stool studies.     I will continue to follow Ms. Jameson closely pending return of the hospitalist team this morning. The nursing staff may call should they have any questions or concerns. Please refer to the medical record for additional information, orders and/or comments.     cc:       MUKESH Hu/[id number]  D:  02/17/2017 06:36:06(Eastern Time)  T:  02/17/2017 07:24:30(Eastern Time)  Voice ID:  [id number]/Document ID:  [id number]

## 2017-02-17 NOTE — PLAN OF CARE
Problem: Acute Coronary Syndrome (ACS) (Adult)  Goal: Signs and Symptoms of Listed Potential Problems Will be Absent or Manageable (Acute Coronary Syndrome)  Outcome: Ongoing (interventions implemented as appropriate)    Problem: Infection, Risk/Actual (Adult)  Goal: Identify Related Risk Factors and Signs and Symptoms  Outcome: Ongoing (interventions implemented as appropriate)  Goal: Infection Prevention/Resolution  Outcome: Ongoing (interventions implemented as appropriate)

## 2017-02-17 NOTE — PLAN OF CARE
Problem: Patient Care Overview (Adult)  Goal: Plan of Care Review  Outcome: Ongoing (interventions implemented as appropriate)    02/17/17 0625   Coping/Psychosocial Response Interventions   Plan Of Care Reviewed With patient;sibling   Patient Care Overview   Progress no change   Outcome Evaluation   Outcome Summary/Follow up Plan Pt presented with SOB, N/V/D, and hypotension. Currently on 2L O2 nasal cannula and on Dopamine gtt going at 8. Slightly sinus tach. BP systolically in the 90s & 100s. Stool and urine cultures still to be collected. Liver ultrasound to be done this morning. No complaints of pain since arrival to unit. Need records from Dr. Alberts at [facility]. Sister claims to be POA. Patient is alert and oriented.          Problem: Acute Coronary Syndrome (ACS) (Adult)  Goal: Signs and Symptoms of Listed Potential Problems Will be Absent or Manageable (Acute Coronary Syndrome)  Outcome: Ongoing (interventions implemented as appropriate)    Problem: Infection, Risk/Actual (Adult)  Goal: Identify Related Risk Factors and Signs and Symptoms  Outcome: Ongoing (interventions implemented as appropriate)  Goal: Infection Prevention/Resolution  Outcome: Ongoing (interventions implemented as appropriate)

## 2017-02-18 NOTE — NURSING NOTE
Pt went home against medical advice. She kept her picc line in place as she had it before admission for outpatient therapy. I flushed each line and capped with an orange cap

## 2017-02-18 NOTE — DISCHARGE SUMMARY
[facility] Medicine Services  DISCHARGE SUMMARY       Date of Admission: 2/17/2017  Date of Discharge:  2/18/2017  Primary Care Physician: Pramod Alberts DO    Presenting Problem/History of Present Illness:  Hypotension, unspecified hypotension type [I95.9]     Final Discharge Diagnoses:  Hypotension on vasopressor (dopamine)  Pain between shoulder blades.  Nausea  Vomiting  Diarrhea at home.  CAD/MI  Hypoalbuminemia/transamintis chronic, sees liver specialist in [facility]  Adrenal insufficiency On chronic steroid therapy  GERD  Hypoxemia  Hx of DVT/PE    Consults: None    Procedures Performed: NOne    Pertinent Test Results:    Collected: 02/17/17 1457        Lab Status: Final result Specimen: Blood from Arm, Right Updated: 02/17/17 1552        Troponin I 0.065 (H) ng/mL        Troponin [[id number]] (Abnormal) Collected: 02/17/17 1232       Lab Status: Final result Specimen: Blood from Arm, Right Updated: 02/17/17 1315        Troponin I 0.073 (H) ng/mL        Troponin [[id number]] (Abnormal) Collected: 02/17/17 0832       Lab Status: Final result Specimen: Blood from Arm, Right Updated: 02/17/17 0945        Troponin I 0.091 (H) ng/mL        Comprehensive Metabolic Panel [[id number]] (Abnormal) Collected: 02/17/17 0832       Lab Status: Final result Specimen: Blood from Arm, Right Updated: 02/17/17 0933        Glucose 86 mg/dL         BUN 24 (H) mg/dL         Creatinine 1.07 mg/dL         Sodium 137 mmol/L         Potassium 4.2 mmol/L         Chloride 104 mmol/L         CO2 26.0 mmol/L         Calcium 8.1 (L) mg/dL         Total Protein 4.9 (L) g/dL         Albumin 2.70 (L) g/dL         ALT (SGPT) 394 (H) U/L         AST (SGOT) 535 (H) U/L         Alkaline Phosphatase 208 (H) U/L         Total Bilirubin 0.3 mg/dL         eGFR Non African Amer 53 (L) mL/min/1.73         Globulin 2.2 gm/dL         A/G Ratio 1.2 g/dL         BUN/Creatinine Ratio 22.4         Anion Gap 7.0 mmol/L         CBC Auto Differential [[id number]] (Abnormal) Collected: 02/17/17 0832       Lab Status: Final result Specimen: Blood from Arm, Right Updated: 02/17/17 1011        WBC 47.89 (C) 10*3/mm3         RBC 4.46 10*6/mm3         Hemoglobin 9.7 (L) g/dL         Hematocrit 34.3 (L) %         MCV 76.9 (L) fL         MCH 21.7 (L) pg         MCHC 28.3 (L) g/dL         RDW 18.2 (H) %         RDW-SD 51.1 fl         MPV 9.5 fL         Platelets 274 10*3/mm3        Scan Slide [[id number]] Collected: 02/17/17 0832       Lab Status: Final result Specimen: Blood from Arm, Right Updated: 02/17/17 1011        Scan Slide --        Urinalysis With / Culture If Indicated [[id number]] (Abnormal) Collected: 02/17/17 0800       Lab Status: Final result Specimen: Urine from Urine, Clean Catch Updated: 02/17/17 0846        Color, UA Yellow         Appearance, UA Cloudy (A)         pH, UA 6.0         Specific [place], UA 1.008         Glucose, UA Negative         Ketones, UA Negative         Bilirubin, UA Negative         Blood, UA Negative         Protein, UA 30 mg/dL (1+) (A)         Leuk Esterase, UA Trace (A)         Nitrite, UA Negative         Urobilinogen, UA 0.2 E.U./dL        Urinalysis, Microscopic Only [[id number]] (Abnormal) Collected: 02/17/17 0800       Lab Status: Final result Specimen: Urine from Urine, Clean Catch Updated: 02/17/17 0846        RBC, UA 6-12 (A) /HPF         WBC, UA 3-5 (A) /HPF         Bacteria, UA 4+ (A) /HPF         Squamous Epithelial Cells, UA 3-6 (A) /HPF         Hyaline Casts, UA None Seen /LPF         Methodology Automated Microscopy        Urine Culture [[id number]] (Abnormal) Collected: 02/17/17 0800       Lab Status: Preliminary result Specimen: Urine from Urine, Clean Catch Updated: 02/18/17 0817        Urine Culture           70,000-80,000 CFU/mL Gram Negative Bacilli (A)       Ammonia [[id number]] (Abnormal) Collected: 02/17/17 0607       Lab Status: Final result Specimen: Blood Updated: 02/17/17 0632         Ammonia <9 (L) umol/L        Protime-INR [[id number]] (Abnormal) Collected: 02/17/17 0607       Lab Status: Final result Specimen: Blood Updated: 02/17/17 0642        Protime 15.8 (H) Seconds         INR 1.22 (H)        aPTT [[id number]] (Normal) Collected: 02/17/17 0607       Lab Status: Final result Specimen: Blood Updated: 02/17/17 0642        PTT 26.4 seconds        Hepatitis Panel, Acute [[id number]] (Normal) Collected: 02/17/17 0555       Lab Status: Final result Specimen: Blood Updated: 02/17/17 0723        HCV S/C Ratio 0.01         Hepatitis C Ab Negative         Hep A IgM Negative         Hep B C IgM Negative         Hepatitis B Surface Ag Negative        Troponin [[id number]] (Abnormal) Collected: 02/17/17 0555       Lab Status: Final result Specimen: Blood Updated: 02/17/17 0645        Troponin I 0.096 (H) ng/mL        Blood Culture [[id number]] (Normal) Collected: 02/17/17 0555       Lab Status: Preliminary result Specimen: Blood from Arm, Left Updated: 02/18/17 0901        Blood Culture No growth at 24 hours        Blood Culture [[id number]] (Normal) Collected: 02/17/17 0555       Lab Status: Preliminary result Specimen: Blood from Arm, Left Updated: 02/18/17 0901        Blood Culture No growth at 24 hours        Amylase [[id number]] (Normal) Collected: 02/17/17 0555       Lab Status: Final result Specimen: Blood Updated: 02/17/17 0639        Amylase 35 U/L        Lipase [[id number]] (Normal) Collected: 02/17/17 0555       Lab Status: Final result Specimen: Blood Updated: 02/17/17 0639        Lipase 85 U/L        Ova & Parasite Examination [[id number]]       CT angio chest  Impression:         No evidence of pulmonary embolism, aortic dissection or  acute intrathoracic pathology. Mild mediastinal lymphadenopathy is  identified. A small 12 mm focus of nodular infiltrate is identified in  the right upper lobe against the pleura. This may represent a focal area  of scarring but follow-up is recommended, consider repeating  "a chest CT  in 6 months. Left subclavian port catheter is present, tip is in the  brachiocephalic vein. Right-sided PICC is present with the tip at the  caval atrial junction. Stable 3.1 x 1.5 cm left adrenal mass.                Hospital Course:  The patient is a 56 y.o. female who presented to [facility] with multiplicity of complaints including nausea, vomiting, diarrhea, diaphoresis, and headache.  She also had pain between shoulder blades. She was evaluated in ER admitted to CCU.  She was on vasopressors.  The were weaned  She decided she did not want to stay and left AMA.    Condition on Discharge:  Awake, aware of potential harm in leaving AMA      Physical Exam on Discharge:  Visit Vitals   • BP 91/60   • Pulse 80   • Temp 97.5 °F (36.4 °C) (Temporal Artery )   • Resp 16   • Ht 66\" (167.6 cm)   • Wt 187 lb 14.4 oz (85.2 kg)   • SpO2 100%   • BMI 30.33 kg/m2     Physical Exam  See note    Discharge Disposition:  Left Against Medical Advice    Discharge Medications:   Marija Jameson   Home Medication Instructions ROB:[id number]    Printed on:02/18/17 3925   Medication Information                      aspirin 81 MG chewable tablet  Chew 1 tablet Daily.             atorvastatin (LIPITOR) 40 MG tablet  Take 1 tablet by mouth Every Night.             clopidogrel (PLAVIX) 75 MG tablet  Take 1 tablet by mouth Daily.             gabapentin (NEURONTIN) 300 MG capsule  Take 300 mg by mouth 3 (Three) Times a Day.             levothyroxine (SYNTHROID, LEVOTHROID) 125 MCG tablet  Take 125 mcg by mouth Daily.             metOLazone (ZAROXOLYN) 5 MG tablet  Take 5 mg by mouth Daily.             midodrine (PROAMATINE) 5 MG tablet  Take 10 mg by mouth 3 (Three) Times a Day.             omeprazole (priLOSEC) 20 MG capsule  Take 20 mg by mouth Daily.             potassium chloride (K-DUR,KLOR-CON) 20 MEQ CR tablet  Take 20 mEq by mouth 2 (Two) Times a Day.             potassium chloride (MICRO-K) 10 MEQ CR " capsule  Take 2 capsules by mouth 2 (Two) Times a Day.             predniSONE (DELTASONE) 10 MG tablet  Take 10 mg by mouth Daily.             QUEtiapine (SEROquel) 100 MG tablet  Take 100 mg by mouth Every Night.             rOPINIRole (REQUIP) 0.5 MG tablet  Take 0.5 mg by mouth Every Night. Take 1 hour before bedtime.                         Lorna James DO  02/18/17  1:46 PM    Time: Time spent on care in ICU and in discharge summary greater than 40 minutes

## 2017-02-18 NOTE — NURSING NOTE
Pt wants to leave AMA so that she can have her father take her to [facility] where she is known to them. I have spoken with Dr. James and the patient and she does want to leave and states she understands the potential risks to her well being.

## 2017-02-19 LAB
B-LACTAMASE USUAL SUSC ISLT: POSITIVE
BACTERIA SPEC AEROBE CULT: ABNORMAL
BACTERIA SPEC AEROBE CULT: ABNORMAL
GRAM STN SPEC: ABNORMAL
ISOLATED FROM: ABNORMAL

## 2017-02-21 LAB
BACTERIA SPEC AEROBE CULT: ABNORMAL

## 2017-02-22 LAB
BACTERIA SPEC AEROBE CULT: NORMAL
BACTERIA SPEC AEROBE CULT: NORMAL

## 2017-03-27 ENCOUNTER — OFFICE VISIT (OUTPATIENT)
Dept: CARDIOLOGY | Facility: CLINIC | Age: 57
End: 2017-03-27

## 2017-03-27 VITALS
HEIGHT: 68 IN | HEART RATE: 83 BPM | DIASTOLIC BLOOD PRESSURE: 64 MMHG | WEIGHT: 201 LBS | BODY MASS INDEX: 30.46 KG/M2 | SYSTOLIC BLOOD PRESSURE: 110 MMHG

## 2017-03-27 DIAGNOSIS — I21.4 NSTEMI (NON-ST ELEVATED MYOCARDIAL INFARCTION) (HCC): ICD-10-CM

## 2017-03-27 DIAGNOSIS — E78.2 MIXED HYPERLIPIDEMIA: Primary | ICD-10-CM

## 2017-03-27 DIAGNOSIS — I82.532 CHRONIC DEEP VEIN THROMBOSIS (DVT) OF POPLITEAL VEIN OF LEFT LOWER EXTREMITY (HCC): ICD-10-CM

## 2017-03-27 DIAGNOSIS — I95.1 ORTHOSTATIC HYPOTENSION: ICD-10-CM

## 2017-03-27 PROCEDURE — 99215 OFFICE O/P EST HI 40 MIN: CPT | Performed by: INTERNAL MEDICINE

## 2017-03-27 PROCEDURE — 93000 ELECTROCARDIOGRAM COMPLETE: CPT | Performed by: INTERNAL MEDICINE

## 2017-03-27 RX ORDER — WARFARIN SODIUM 5 MG/1
5 TABLET ORAL
Qty: 180 TABLET | Refills: 3 | Status: SHIPPED | OUTPATIENT
Start: 2017-03-27

## 2017-03-27 RX ORDER — SPIRONOLACTONE 25 MG/1
25 TABLET ORAL DAILY
COMMUNITY

## 2017-03-27 NOTE — PROGRESS NOTES
Referring Provider: Pramod Alberts DO    Reason for Follow-up Visit: CAD    Subjective .   Chief Complaint:   Chief Complaint   Patient presents with   • Follow-up     3 mo hosp fu sp cath with stent.  doing well after stent.  has other health issues.   • Coronary Artery Disease     no chest pain since stent   • Palpitations     has some fluttering after the albuiman treatments   • Shortness of Breath     has a little with walking.       History of present illness:  Marija Jameson is a 56 y.o. yo female with history of DVT, s/p IVC filter placement for undetermined reasons last yr. No records available and no indication as why the filter is still present. She had a NSTEMI last December and was given a bare metal stent with instructions to take plavix for 30 days only and then to restart coumadin which has not been done. She has chest pain every time she gets albumin transfusions but none with exertion.       History  Past Medical History:   Diagnosis Date   • Abnormal albumin    • Clotting disorder    • Coronary artery disease    • Disease of thyroid gland    • Hyperlipidemia    • Hypertension    • MI (myocardial infarction)    ,   Past Surgical History:   Procedure Laterality Date   • CARDIAC CATHETERIZATION N/A 12/23/2016    Procedure: Left Heart Cath;  Surgeon: Alexx Richardson MD;  Location:  [facility] CATH INVASIVE LOCATION;  Service:    • CHOLECYSTECTOMY     • CORONARY STENT PLACEMENT     • EXPLORATORY LAPAROTOMY     • HERNIA REPAIR     • HYSTERECTOMY     ,   Family History   Problem Relation Age of Onset   • Heart attack Mother    • Heart disease Father    • No Known Problems Sister    • No Known Problems Brother    • No Known Problems Maternal Grandmother    • Heart attack Maternal Grandfather    • Heart disease Maternal Grandfather    • No Known Problems Paternal Grandmother    • No Known Problems Paternal Grandfather    • No Known Problems Sister    ,   Social History   Substance Use Topics   •  Smoking status: Current Every Day Smoker     Packs/day: 1.00     Years: 15.00     Start date: 1976   • Smokeless tobacco: Never Used   • Alcohol use No   ,     Medications  Current Outpatient Prescriptions   Medication Sig Dispense Refill   • atorvastatin (LIPITOR) 40 MG tablet Take 1 tablet by mouth Every Night. 30 tablet 11   • clopidogrel (PLAVIX) 75 MG tablet Take 1 tablet by mouth Daily. 30 tablet 1   • gabapentin (NEURONTIN) 300 MG capsule Take 300 mg by mouth 3 (Three) Times a Day.     • levothyroxine (SYNTHROID, LEVOTHROID) 125 MCG tablet Take 125 mcg by mouth Daily.     • metOLazone (ZAROXOLYN) 5 MG tablet Take 5 mg by mouth Daily.     • midodrine (PROAMATINE) 5 MG tablet Take 10 mg by mouth 3 (Three) Times a Day. As needed     • omeprazole (priLOSEC) 20 MG capsule Take 20 mg by mouth Daily.     • potassium chloride (K-DUR,KLOR-CON) 20 MEQ CR tablet Take 20 mEq by mouth 2 (Two) Times a Day.     • predniSONE (DELTASONE) 10 MG tablet Take 10 mg by mouth Daily.     • QUEtiapine (SEROquel) 100 MG tablet Take 100 mg by mouth Every Night.     • rOPINIRole (REQUIP) 0.5 MG tablet Take 0.5 mg by mouth Every Night. Take 1 hour before bedtime.     • spironolactone (ALDACTONE) 25 MG tablet Take 25 mg by mouth Daily.     • aspirin 81 MG chewable tablet Chew 1 tablet Daily. 100 tablet 1   • potassium chloride (MICRO-K) 10 MEQ CR capsule Take 2 capsules by mouth 2 (Two) Times a Day. 60 capsule 11     No current facility-administered medications for this visit.        Allergies:  Review of patient's allergies indicates no known allergies.    Review of Systems  Review of Systems   HENT: Negative for nosebleeds.    Cardiovascular: Positive for chest pain and leg swelling. Negative for claudication, cyanosis, dyspnea on exertion, irregular heartbeat, near-syncope, orthopnea, palpitations, paroxysmal nocturnal dyspnea and syncope.   Respiratory: Negative for cough, hemoptysis and shortness of breath.    Gastrointestinal:  "Negative for dysphagia, hematemesis, hematochezia and melena.   Genitourinary: Negative for hematuria.   All other systems reviewed and are negative.      Objective     Physical Exam:  /64 (BP Location: Left arm, Patient Position: Sitting, Cuff Size: Adult)  Pulse 83  Ht 68\" (172.7 cm)  Wt 201 lb (91.2 kg)  BMI 30.56 kg/m2  Physical Exam   Constitutional: She is oriented to person, place, and time. She appears well-developed and well-nourished. No distress.   HENT:   Head: Normocephalic and atraumatic.   Eyes: No scleral icterus.   Neck: Normal range of motion.   Cardiovascular: Normal rate, regular rhythm and normal heart sounds.  Exam reveals no gallop and no friction rub.    No murmur heard.  Pulmonary/Chest: Effort normal and breath sounds normal. No respiratory distress. She has no wheezes. She has no rales.   Abdominal: Soft. Bowel sounds are normal. She exhibits no distension. There is no tenderness.   Musculoskeletal: She exhibits edema.   Neurological: She is alert and oriented to person, place, and time. No cranial nerve deficit.   Skin: Skin is warm and dry. She is not diaphoretic. No erythema.   Psychiatric: She has a normal mood and affect. Her behavior is normal.       Results Review:    ECG 12 Lead  Date/Time: 3/27/2017 8:59 AM  Performed by: MARKEL JIN  Authorized by: MARKEL JIN   Comparison: compared with previous ECG   Similar to previous ECG  Rhythm: sinus rhythm  Rate: normal  ST Segments: ST segments normal  T Waves: T waves normal  QRS axis: normal  Clinical impression: non-specific ECG  Comments: Poor R            Admission on 02/17/2017, Discharged on 02/17/2017   Component Date Value Ref Range Status   • Blood Culture 02/17/2017 Abnormal Stain*  Final   • Blood Culture 02/17/2017 Staphylococcus, coagulase negative*  Final   • Isolated from 02/17/2017 Aerobic and Anaerobic Bottles   Final   • BETA LACTAMASE 02/17/2017 Positive   Final   • Gram Stain Result 02/17/2017 Gram " positive cocci in clusters   Final   • Extra Tube 02/17/2017 hold for add-on   Final    Auto resulted   • Extra Tube 02/17/2017 Hold for add-ons.   Final    Auto resulted.   • Extra Tube 02/17/2017 hold for add-on   Final    Auto resulted   • Extra Tube 02/17/2017 Hold for add-ons.   Final    Auto resulted.   • Glucose 02/17/2017 89  70 - 100 mg/dL Final   • BUN 02/17/2017 23* 5 - 21 mg/dL Final   • Creatinine 02/17/2017 1.02  0.50 - 1.40 mg/dL Final   • Sodium 02/17/2017 137  135 - 145 mmol/L Final   • Potassium 02/17/2017 3.6  3.5 - 5.3 mmol/L Final   • Chloride 02/17/2017 105  98 - 110 mmol/L Final   • CO2 02/17/2017 23.0* 24.0 - 31.0 mmol/L Final   • Calcium 02/17/2017 8.7  8.4 - 10.4 mg/dL Final   • Total Protein 02/17/2017 4.8* 6.3 - 8.7 g/dL Final   • Albumin 02/17/2017 2.80* 3.50 - 5.00 g/dL Final   • ALT (SGPT) 02/17/2017 220* 0 - 54 U/L Final   • AST (SGOT) 02/17/2017 468* 7 - 45 U/L Final   • Alkaline Phosphatase 02/17/2017 214* 24 - 120 U/L Final   • Total Bilirubin 02/17/2017 0.4  0.1 - 1.0 mg/dL Final   • eGFR Non African Amer 02/17/2017 56* >60 mL/min/1.73 Final   • Globulin 02/17/2017 2.0  gm/dL Final   • A/G Ratio 02/17/2017 1.4  1.1 - 2.5 g/dL Final   • BUN/Creatinine Ratio 02/17/2017 22.5  7.0 - 25.0 Final   • Anion Gap 02/17/2017 9.0  4.0 - 13.0 mmol/L Final   • Myoglobin 02/17/2017 86.6  0.0 - 110.0 ng/mL Final   • CKMB 02/17/2017 1.75  0.00 - 5.00 ng/mL Final   • C-Reactive Protein 02/17/2017 <0.50  0.00 - 0.99 mg/dL Final   • Sed Rate 02/17/2017 1  0 - 20 mm/hr Final   • proBNP 02/17/2017 11150.0* 0.0 - 900.0 pg/mL Final   • D-Dimer, Quantitative 02/17/2017 3.66* 0.00 - 0.50 mg/L (FEU) Final   • WBC 02/17/2017 18.01* 4.80 - 10.80 10*3/mm3 Final   • RBC 02/17/2017 4.56  4.20 - 5.40 10*6/mm3 Final   • Hemoglobin 02/17/2017 10.0* 12.0 - 16.0 g/dL Final   • Hematocrit 02/17/2017 34.8* 37.0 - 47.0 % Final   • MCV 02/17/2017 76.3* 82.0 - 98.0 fL Final   • MCH 02/17/2017 21.9* 28.0 - 32.0 pg Final    • MCHC 02/17/2017 28.7* 33.0 - 36.0 g/dL Final   • RDW 02/17/2017 18.3* 12.0 - 15.0 % Final   • RDW-SD 02/17/2017 50.8  40.0 - 54.0 fl Final   • MPV 02/17/2017 9.2  6.0 - 12.0 fL Final   • Platelets 02/17/2017 262  130 - 400 10*3/mm3 Final   • Neutrophil % 02/17/2017 96.6* 39.0 - 78.0 % Final   • Lymphocyte % 02/17/2017 1.0* 15.0 - 45.0 % Final   • Monocyte % 02/17/2017 0.2* 4.0 - 12.0 % Final   • Eosinophil % 02/17/2017 0.2  0.0 - 4.0 % Final   • Basophil % 02/17/2017 0.1  0.0 - 2.0 % Final   • Immature Grans % 02/17/2017 1.9  0.0 - 5.0 % Final   • Neutrophils, Absolute 02/17/2017 17.41* 1.87 - 8.40 10*3/mm3 Final   • Lymphocytes, Absolute 02/17/2017 0.18* 0.72 - 4.86 10*3/mm3 Final   • Monocytes, Absolute 02/17/2017 0.04* 0.19 - 1.30 10*3/mm3 Final   • Eosinophils, Absolute 02/17/2017 0.03  0.00 - 0.70 10*3/mm3 Final   • Basophils, Absolute 02/17/2017 0.01  0.00 - 0.20 10*3/mm3 Final   • Immature Grans, Absolute 02/17/2017 0.34* 0.00 - 0.03 10*3/mm3 Final   • nRBC 02/17/2017 0.5* 0.0 - 0.0 /100 WBC Final   • Site 02/17/2017 Arterial: right radial   Final   • Faraz's Test 02/17/2017    Final    Documented in Rapid [facility]   • pH, Arterial 02/17/2017 7.404  7.350 - 7.450 pH units Final   • pCO2, Arterial 02/17/2017 33.8* 35.0 - 45.0 mm Hg Final   • pO2, Arterial 02/17/2017 62.5* 80.0 - 100.0 mm Hg Final   • HCO3, Arterial 02/17/2017 20.7* 22.0 - 26.0 mmol/L Final   • Base Excess, Arterial 02/17/2017 -3.5* -2.0 - 2.0 mmol/L Final   • Hemoglobin, Blood Gas 02/17/2017 9.9* 12 - 16 g/dL Final   • Oxyhemoglobin 02/17/2017 83.8* 94 - 97 % Final   • Methemoglobin 02/17/2017 0.0* 0.4 - 1.5 % Final   • Carboxyhemoglobin 02/17/2017 4.0  0 - 5.1 % Final   • Sodium, Arterial 02/17/2017 133.8* 135 - 145 mmol/L Final   • Potassium, Arterial 02/17/2017 3.49* 3.5 - 5 mmol/L Final   • Barometric Pressure for Blood Gas 02/17/2017   mmHg Final    Component not reported at this site.   • Modality 02/17/2017 Room air   Final   • Notified  Who 02/17/2017 Dr. Carlson   Final   • Notified By 02/17/2017 Leana RRT [id number]   Final   • Notified Time 02/17/2017 2/17/2017 2:09:50 AM   Final   • Troponin I 02/17/2017 0.07  0.00 - 0.07 ng/mL Final    Serial Number: [id number]    : [id number]   • HCV S/C Ratio 02/17/2017 0.01  0.00 - 0.99 Final   • Hepatitis C Ab 02/17/2017 Negative  Negative Final   • Hep A IgM 02/17/2017 Negative  Negative Final   • Hep B C IgM 02/17/2017 Negative  Negative Final   • Hepatitis B Surface Ag 02/17/2017 Negative  Negative Final   • Troponin I 02/17/2017 0.096* 0.000 - 0.034 ng/mL Final   • Blood Culture 02/17/2017 No growth at 5 days   Final   • Blood Culture 02/17/2017 No growth at 5 days   Final   • Color, UA 02/17/2017 Yellow  Yellow, Straw Final   • Appearance, UA 02/17/2017 Cloudy* Clear Final   • pH, UA 02/17/2017 6.0  5.0 - 8.0 Final   • Specific Gravity, UA 02/17/2017 1.008  1.005 - 1.030 Final   • Glucose, UA 02/17/2017 Negative  Negative Final   • Ketones, UA 02/17/2017 Negative  Negative Final   • Bilirubin, UA 02/17/2017 Negative  Negative Final   • Blood, UA 02/17/2017 Negative  Negative Final   • Protein, UA 02/17/2017 30 mg/dL (1+)* Negative Final   • Leuk Esterase, UA 02/17/2017 Trace* Negative Final   • Nitrite, UA 02/17/2017 Negative  Negative Final   • Urobilinogen, UA 02/17/2017 0.2 E.U./dL  0.2 - 1.0 E.U./dL Final   • Ammonia 02/17/2017 <9* 9 - 33 umol/L Final   • Protime 02/17/2017 15.8* 11.9 - 14.6 Seconds Final   • INR 02/17/2017 1.22* 0.91 - 1.09 Final   • PTT 02/17/2017 26.4  24.1 - 34.8 seconds Final   • Amylase 02/17/2017 35  30 - 110 U/L Final   • Lipase 02/17/2017 85  23 - 203 U/L Final   • Troponin I 02/17/2017 0.091* 0.000 - 0.034 ng/mL Final   • Troponin I 02/17/2017 0.073* 0.000 - 0.034 ng/mL Final   • Glucose 02/17/2017 86  70 - 100 mg/dL Final   • BUN 02/17/2017 24* 5 - 21 mg/dL Final   • Creatinine 02/17/2017 1.07  0.50 - 1.40 mg/dL Final   • Sodium 02/17/2017 137  135 - 145 mmol/L Final   •  Potassium 02/17/2017 4.2  3.5 - 5.3 mmol/L Final   • Chloride 02/17/2017 104  98 - 110 mmol/L Final   • CO2 02/17/2017 26.0  24.0 - 31.0 mmol/L Final   • Calcium 02/17/2017 8.1* 8.4 - 10.4 mg/dL Final   • Total Protein 02/17/2017 4.9* 6.3 - 8.7 g/dL Final   • Albumin 02/17/2017 2.70* 3.50 - 5.00 g/dL Final   • ALT (SGPT) 02/17/2017 394* 0 - 54 U/L Final   • AST (SGOT) 02/17/2017 535* 7 - 45 U/L Final   • Alkaline Phosphatase 02/17/2017 208* 24 - 120 U/L Final   • Total Bilirubin 02/17/2017 0.3  0.1 - 1.0 mg/dL Final   • eGFR Non African Amer 02/17/2017 53* >60 mL/min/1.73 Final   • Globulin 02/17/2017 2.2  gm/dL Final   • A/G Ratio 02/17/2017 1.2  1.1 - 2.5 g/dL Final   • BUN/Creatinine Ratio 02/17/2017 22.4  7.0 - 25.0 Final   • Anion Gap 02/17/2017 7.0  4.0 - 13.0 mmol/L Final   • WBC 02/17/2017 47.89* 4.80 - 10.80 10*3/mm3 Final   • RBC 02/17/2017 4.46  4.20 - 5.40 10*6/mm3 Final   • Hemoglobin 02/17/2017 9.7* 12.0 - 16.0 g/dL Final   • Hematocrit 02/17/2017 34.3* 37.0 - 47.0 % Final   • MCV 02/17/2017 76.9* 82.0 - 98.0 fL Final   • MCH 02/17/2017 21.7* 28.0 - 32.0 pg Final   • MCHC 02/17/2017 28.3* 33.0 - 36.0 g/dL Final   • RDW 02/17/2017 18.2* 12.0 - 15.0 % Final   • RDW-SD 02/17/2017 51.1  40.0 - 54.0 fl Final   • MPV 02/17/2017 9.5  6.0 - 12.0 fL Final   • Platelets 02/17/2017 274  130 - 400 10*3/mm3 Final   • RBC, UA 02/17/2017 6-12* None Seen /HPF Final   • WBC, UA 02/17/2017 3-5* None Seen /HPF Final   • Bacteria, UA 02/17/2017 4+* None Seen /HPF Final   • Squamous Epithelial Cells, UA 02/17/2017 3-6* None Seen, 0-2 /HPF Final   • Hyaline Casts, UA 02/17/2017 None Seen  None Seen /LPF Final   • Methodology 02/17/2017 Automated Microscopy   Final   • Urine Culture 02/17/2017 70,000-80,000 CFU/mL Enterobacter cloacae complex*  Final   • Urine Culture 02/17/2017 60,000-70,000 CFU/mL Escherichia coli*  Final   • Urine Culture 02/17/2017 >100,000 CFU/mL Enterococcus faecium, VRE*  Corrected    Comment:    Vancomycin Resistant Enterococcus species. Patient may be an isolation risk.                             Corrected Result: Previously Reported Organism Changed. Previous result was Enterococcus species, VRE on 2/20/2017 at 0911 CST   • Scan Slide 02/17/2017    Final    See Manual Differential Results   • Neutrophil % 02/17/2017 80.0* 39.0 - 78.0 % Final   • Lymphocyte % 02/17/2017 1.0* 15.0 - 45.0 % Final   • Monocyte % 02/17/2017 1.0* 4.0 - 12.0 % Final   • Bands %  02/17/2017 18.0* 0.0 - 10.0 % Final   • Neutrophils Absolute 02/17/2017 46.93* 1.87 - 8.40 10*3/mm3 Final   • Lymphocytes Absolute 02/17/2017 0.48* 0.72 - 4.86 10*3/mm3 Final   • Monocytes Absolute 02/17/2017 0.48  0.19 - 1.30 10*3/mm3 Final   • Hypochromia 02/17/2017 Slight/1+  None Seen Final   • Poikilocytes 02/17/2017 Slight/1+  None Seen Final   • WBC Morphology 02/17/2017 Normal  Normal Final   • Platelet Morphology 02/17/2017 Normal  Normal Final   • Troponin I 02/17/2017 0.065* 0.000 - 0.034 ng/mL Final   • BCID, PCR 02/17/2017 Staphylococcus spp, not aureus. Identification by BCID PCR.* Negative by BCID PCR. Culture to Follow. Final       Assessment/Plan   Marija was seen today for follow-up, coronary artery disease, palpitations and shortness of breath.    Diagnoses and all orders for this visit:    NSTEMI (non-ST elevated myocardial infarction), doing well s/p bare metal stent to the RCA. Will D/C plavix, restart coumadin and cont asa    Mixed hyperlipidemia, needs to be checked    Orthostatic hypotension due to hypoalbunemia, currently stable.     Hypoalbunemia, followed at [facility] in [place]. She will ask them if the filter needs to be retrieved

## 2017-08-07 ENCOUNTER — OUTSIDE FACILITY SERVICE (OUTPATIENT)
Dept: CARDIOLOGY | Facility: CLINIC | Age: 57
End: 2017-08-07

## 2021-12-22 NOTE — ED NOTES
DR. CARLOS NOTIFIED OF PT'S BP, NEW ORDERS RECEIVED BUT PUT ON HOLD AT THIS TIME, PT IS ASYMPTOMATIC, IF S/S ARISE DRIP WILL BE STARTED PER PROTOCOL      Mariluz Rojas RN  02/17/17 [id number]    
show